# Patient Record
Sex: FEMALE | URBAN - METROPOLITAN AREA
[De-identification: names, ages, dates, MRNs, and addresses within clinical notes are randomized per-mention and may not be internally consistent; named-entity substitution may affect disease eponyms.]

---

## 2018-01-19 ENCOUNTER — IMPORTED ENCOUNTER (OUTPATIENT)
Dept: URBAN - METROPOLITAN AREA CLINIC 9 | Facility: CLINIC | Age: 73
End: 2018-01-19

## 2018-01-19 PROBLEM — H02.413: Noted: 2018-01-19

## 2018-01-19 PROBLEM — H04.123: Noted: 2018-01-19

## 2018-01-19 PROBLEM — E11.9: Noted: 2018-01-19

## 2018-01-19 PROBLEM — H11.152: Noted: 2018-01-19

## 2020-06-12 NOTE — PATIENT DISCUSSION
Option of observation vs tx. Enough evidence today that pt should start tx. Dx now POAG Mild. Gtts vs SLT reviewed with pt. Pt chooses to try drop first and rtc in one month for IOP check.

## 2020-07-17 NOTE — PATIENT DISCUSSION
Option of observation to wait for definitive change although enough evidence now that tx is recommended. Option of a glc drop vs SLT. R&B of each reviewed. Pt chooses to try.

## 2020-07-17 NOTE — PATIENT DISCUSSION
GOOD RESPONSE TO LUMIGAN.   OK FOR PT TO USE LATANOPROST.  FOLLOW W/ BRI EVERY 6 MONTHS W/ HVF, OCT AND OPTIC NERVE EVAL.  IF CHANGES, SEND BACK TO TRAV.

## 2021-10-16 ASSESSMENT — TONOMETRY
OD_IOP_MMHG: 15
OS_IOP_MMHG: 14

## 2021-10-16 ASSESSMENT — VISUAL ACUITY
OS_CC: 20/100 - SN
OD_CC: 20/25 -2 SN
OS_PH: 20/400 SN
OD_SC: 20/25 -2 SN
OS_SC: 20/400 SN

## 2023-06-15 NOTE — PATIENT DISCUSSION
Option of observation to wait for definitive change although enough evidence now that tx is recommended. Option of a glc drop vs SLT. R&B of each reviewed. Pt chooses to try. never

## 2024-03-19 ENCOUNTER — ESTABLISHED PATIENT (OUTPATIENT)
Facility: LOCATION | Age: 79
End: 2024-03-19

## 2024-03-19 ASSESSMENT — TONOMETRY
OS_IOP_MMHG: 12
OD_IOP_MMHG: 12

## 2024-03-19 ASSESSMENT — VISUAL ACUITY
OU_CC: 20/25-1
OS_CC: 20/200
OD_CC: 20/30-1
OU_SC: 20/30-1
OD_SC: 20/30-1
OS_SC: 20/400